# Patient Record
Sex: MALE | Race: WHITE | Employment: FULL TIME | URBAN - NONMETROPOLITAN AREA
[De-identification: names, ages, dates, MRNs, and addresses within clinical notes are randomized per-mention and may not be internally consistent; named-entity substitution may affect disease eponyms.]

---

## 2021-11-29 ENCOUNTER — HOSPITAL ENCOUNTER (EMERGENCY)
Age: 31
Discharge: HOME | End: 2021-11-29
Attending: EMERGENCY MEDICINE
Payer: COMMERCIAL

## 2021-11-29 VITALS
HEIGHT: 68 IN | WEIGHT: 200 LBS | DIASTOLIC BLOOD PRESSURE: 83 MMHG | HEART RATE: 92 BPM | TEMPERATURE: 98 F | RESPIRATION RATE: 16 BRPM | OXYGEN SATURATION: 100 % | SYSTOLIC BLOOD PRESSURE: 130 MMHG | BODY MASS INDEX: 30.31 KG/M2

## 2021-11-29 DIAGNOSIS — S61.452A DOG BITE OF LEFT HAND, INITIAL ENCOUNTER: Primary | ICD-10-CM

## 2021-11-29 DIAGNOSIS — S61.412A LACERATION OF LEFT HAND WITHOUT FOREIGN BODY, INITIAL ENCOUNTER: ICD-10-CM
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
## 2021-11-29 PROCEDURE — 2500000003 HC RX 250 WO HCPCS: Performed by: EMERGENCY MEDICINE

## 2021-11-29 PROCEDURE — 6360000002 HC RX W HCPCS: Performed by: EMERGENCY MEDICINE

## 2021-11-29 PROCEDURE — 99284 EMERGENCY DEPT VISIT MOD MDM: CPT

## 2021-11-29 PROCEDURE — 12001 RPR S/N/AX/GEN/TRNK 2.5CM/<: CPT

## 2021-11-29 PROCEDURE — 96372 THER/PROPH/DIAG INJ SC/IM: CPT

## 2021-11-29 PROCEDURE — 6370000000 HC RX 637 (ALT 250 FOR IP): Performed by: EMERGENCY MEDICINE

## 2021-11-29 PROCEDURE — 2500000003 HC RX 250 WO HCPCS

## 2021-11-29 RX ORDER — LIDOCAINE HYDROCHLORIDE 10 MG/ML
4 INJECTION, SOLUTION EPIDURAL; INFILTRATION; INTRACAUDAL; PERINEURAL ONCE
Status: COMPLETED | OUTPATIENT
Start: 2021-11-29 | End: 2021-11-29

## 2021-11-29 RX ORDER — DOXYCYCLINE HYCLATE 100 MG
100 TABLET ORAL ONCE
Status: COMPLETED | OUTPATIENT
Start: 2021-11-29 | End: 2021-11-29

## 2021-11-29 RX ORDER — DOXYCYCLINE HYCLATE 100 MG
100 TABLET ORAL 2 TIMES DAILY
Qty: 14 TABLET | Refills: 0 | Status: SHIPPED | OUTPATIENT
Start: 2021-11-29 | End: 2021-12-06

## 2021-11-29 RX ORDER — METRONIDAZOLE 500 MG/1
500 TABLET ORAL ONCE
Status: COMPLETED | OUTPATIENT
Start: 2021-11-29 | End: 2021-11-29

## 2021-11-29 RX ORDER — METRONIDAZOLE 500 MG/1
500 TABLET ORAL 2 TIMES DAILY
Qty: 14 TABLET | Refills: 0 | Status: SHIPPED | OUTPATIENT
Start: 2021-11-29 | End: 2021-12-06

## 2021-11-29 RX ORDER — LIDOCAINE HYDROCHLORIDE 10 MG/ML
INJECTION, SOLUTION EPIDURAL; INFILTRATION; INTRACAUDAL; PERINEURAL
Status: COMPLETED
Start: 2021-11-29 | End: 2021-11-29

## 2021-11-29 RX ADMIN — DOXYCYCLINE HYCLATE 100 MG: 100 TABLET, COATED ORAL at 22:23

## 2021-11-29 RX ADMIN — LIDOCAINE HYDROCHLORIDE 1000 MG: 10 INJECTION, SOLUTION EPIDURAL; INFILTRATION; INTRACAUDAL; PERINEURAL at 21:37

## 2021-11-29 RX ADMIN — METRONIDAZOLE 500 MG: 500 TABLET ORAL at 22:23

## 2021-11-29 RX ADMIN — LIDOCAINE HYDROCHLORIDE 4 ML: 10 INJECTION, SOLUTION EPIDURAL; INFILTRATION; INTRACAUDAL; PERINEURAL at 22:24

## 2021-11-29 RX ADMIN — LIDOCAINE HYDROCHLORIDE 2 ML: 10 INJECTION, SOLUTION EPIDURAL; INFILTRATION; INTRACAUDAL; PERINEURAL at 21:38

## 2021-11-29 ASSESSMENT — PAIN DESCRIPTION - ORIENTATION: ORIENTATION: RIGHT

## 2021-11-29 ASSESSMENT — PAIN SCALES - GENERAL
PAINLEVEL_OUTOF10: 0
PAINLEVEL_OUTOF10: 8

## 2021-11-29 ASSESSMENT — PAIN DESCRIPTION - LOCATION: LOCATION: HAND

## 2021-11-30 NOTE — ED NOTES
Patient observed fully dressed walking around the room, resp easy, intact dressing dry. Patient stable discharge instructions provided. Patient educated on medications, pain control, icing, elevation, signs and symptoms to monitor and follow up. Patient verbalized understanding, questions answered. Observed patient steadily ambulate from the department after discharge.       Karan Cardenas RN  11/29/21 8460

## 2021-11-30 NOTE — ED TRIAGE NOTES
Patient reported, \"his dog bit him. He needs to be put down and it is supposed to happen next week. [place] tyler, he was trying to get up biting the air, and I tried to help him and he got me. I cleaned it and dressed it at home. Td up to date.' Observed top of the left hand, gapping 1 cm laceration without active bleeding.

## 2021-11-30 NOTE — ED PROVIDER NOTES
[address]  [address] [place] [address] [address]  Phone: [phone]    eMERGENCY dEPARTMENT eNCOUnter           CHIEF COMPLAINT       Chief Complaint   Patient presents with    Animal Bite       Nurses Notes reviewed and I agree except as noted in the HPI. HISTORY OF PRESENT ILLNESS    Katerina Lockett is a 33 y.o. male who presented via private vehicle. Chief complaint dog bite to left hand. He said that he was helping his ill dog getting into a truck when the dog tried to latch onto something and inadvertently bit the patient's left hand. Patient is complaining of mild, sharp intermittent pain of the left hand which is worse with movement. He denies focal weakness or numbness. He is up-to-date on his tetanus. REVIEW OF SYSTEMS     None except as mentioned above    PAST MEDICAL HISTORY    has no past medical history on file. SURGICAL HISTORY      has no past surgical history on file. CURRENT MEDICATIONS       Previous Medications    No medications on file       ALLERGIES     is allergic to pcn [penicillins]. FAMILY HISTORY     has no family status information on file. family history is not on file. SOCIAL HISTORY      reports that he has been smoking. He uses smokeless tobacco. He reports that he does not drink alcohol and does not use drugs. PHYSICAL EXAM     INITIAL VITALS:  height is 5' 8\" (1.727 m) and weight is 200 lb (90.7 kg). His tympanic temperature is 98 °F (36.7 °C). His blood pressure is 130/83 and his pulse is 92. His respiration is 16 and oxygen saturation is 99%. Physical Exam  Constitutional:       General: He is in acute distress. Comments: Patient looks emotional   HENT:      Head: Atraumatic. Musculoskeletal:      Comments: Examination of the left hand showed 1 cm laceration involving the central dorsal aspect of the hand. There is mild swelling.   He has normal neurovascular semination of all fingers    Neurological: Mental Status: He is alert. DIFFERENTIAL DIAGNOSIS:       DIAGNOSTIC RESULTS       LABS:   Labs Reviewed - No data to display    EMERGENCY DEPARTMENT COURSE:   Vitals:    Vitals:    11/29/21 2115   BP: 130/83   Pulse: 92   Resp: 16   Temp: 98 °F (36.7 °C)   TempSrc: Tympanic   SpO2: 99%   Weight: 200 lb (90.7 kg)   Height: 5' 8\" (1.727 m)     Patient is allergic to penicillin, he received Rocephin IM and Flagyl p.o. Tolerated well. He stated that he has an established orthopedic surgeon in family. He will follow up with him in a.m. PROCEDURES:  Left hand laceration repair:  Topical anesthesia was achieved with 3 mL of 1% lidocaine without epinephrine. I explored the wound, there is no foreign body the wound is rather deep, involve the skin and interdigital muscular space. I irrigated with saline and Betadine. No tendon was visible. He has normal motor function and extension and sensory exam all fingers. FINAL IMPRESSION      1. Dog bite of left hand, initial encounter    2. Laceration of left hand without foreign body, initial encounter          DISPOSITION/PLAN   Discharged home in good condition.   PATIENT REFERRED TO:    Your orthopedic surgeon  In 1 day        DISCHARGE MEDICATIONS:  New Prescriptions    DOXYCYCLINE HYCLATE (VIBRA-TABS) 100 MG TABLET    Take 1 tablet by mouth 2 times daily for 7 days    METRONIDAZOLE (FLAGYL) 500 MG TABLET    Take 1 tablet by mouth 2 times daily for 7 days       (Please note that portions of this note were completed with a voice recognition program.  Efforts were made to edit the dictations but occasionally words are mis-transcribed.)    MD Beverly Basilio MD  11/29/21 9988       Beverly Bobo MD  11/29/21 5818

## 2021-11-30 NOTE — ED NOTES
Observed 2 stitches in wound. Wound dressed as ordered. Patient tolerated with some discomfort.      Gen Solomon RN  11/29/21 2868